# Patient Record
Sex: FEMALE | Race: BLACK OR AFRICAN AMERICAN | NOT HISPANIC OR LATINO | URBAN - METROPOLITAN AREA
[De-identification: names, ages, dates, MRNs, and addresses within clinical notes are randomized per-mention and may not be internally consistent; named-entity substitution may affect disease eponyms.]

---

## 2022-06-10 ENCOUNTER — LAB OUTSIDE AN ENCOUNTER (OUTPATIENT)
Dept: URBAN - METROPOLITAN AREA CLINIC 25 | Facility: CLINIC | Age: 72
End: 2022-06-10

## 2022-06-10 ENCOUNTER — OFFICE VISIT (OUTPATIENT)
Dept: URBAN - METROPOLITAN AREA CLINIC 25 | Facility: CLINIC | Age: 72
End: 2022-06-10
Payer: MEDICARE

## 2022-06-10 VITALS
DIASTOLIC BLOOD PRESSURE: 79 MMHG | BODY MASS INDEX: 33.06 KG/M2 | TEMPERATURE: 98.1 F | HEIGHT: 60 IN | WEIGHT: 168.4 LBS | SYSTOLIC BLOOD PRESSURE: 130 MMHG | HEART RATE: 97 BPM

## 2022-06-10 DIAGNOSIS — D53.9 MACROCYTIC ANEMIA: ICD-10-CM

## 2022-06-10 DIAGNOSIS — R74.8 ABNORMAL LIVER ENZYMES: ICD-10-CM

## 2022-06-10 DIAGNOSIS — K59.04 CHRONIC IDIOPATHIC CONSTIPATION: ICD-10-CM

## 2022-06-10 DIAGNOSIS — D69.6 THROMBOCYTOPENIA: ICD-10-CM

## 2022-06-10 DIAGNOSIS — R13.10 ESOPHAGEAL DYSPHAGIA: ICD-10-CM

## 2022-06-10 DIAGNOSIS — R19.5 OCCULT BLOOD POSITIVE STOOL: ICD-10-CM

## 2022-06-10 PROCEDURE — 99204 OFFICE O/P NEW MOD 45 MIN: CPT | Performed by: INTERNAL MEDICINE

## 2022-06-10 RX ORDER — BISACODYL 5 MG/1
TAKE 2 TABLETS BY ORAL ROUTE ONCE TABLET, COATED ORAL 1
Qty: 2 | Refills: 0 | Status: ACTIVE | COMMUNITY
Start: 2018-02-07

## 2022-06-10 RX ORDER — AMLODIPINE BESYLATE 10 MG/1
TABLET ORAL
Qty: 0 | Refills: 0 | Status: ACTIVE | COMMUNITY
Start: 2017-11-08

## 2022-06-10 RX ORDER — MONTELUKAST SODIUM 10 MG/1
TAKE 1 TABLET (10 MG) BY ORAL ROUTE ONCE DAILY IN THE EVENING TABLET, FILM COATED ORAL 1
Qty: 0 | Refills: 0 | Status: DISCONTINUED | COMMUNITY
Start: 1900-01-01

## 2022-06-10 RX ORDER — ROSUVASTATIN CALCIUM 20 MG/1
TABLET, FILM COATED ORAL
Qty: 0 | Refills: 0 | Status: DISCONTINUED | COMMUNITY
Start: 2017-11-08

## 2022-06-10 RX ORDER — ASPIRIN 81 MG/1
TABLET, CHEWABLE ORAL
Qty: 0 | Refills: 0 | Status: ACTIVE | COMMUNITY
Start: 1900-01-01

## 2022-06-10 RX ORDER — POLYETHYLENE GLYCOL 3350, SODIUM SULFATE ANHYDROUS, SODIUM BICARBONATE, SODIUM CHLORIDE, POTASSIUM CHLORIDE 236; 22.74; 6.74; 5.86; 2.97 G/4L; G/4L; G/4L; G/4L; G/4L
AS DIRECTED POWDER, FOR SOLUTION ORAL ONCE
Qty: 4000 | Refills: 0 | OUTPATIENT
Start: 2022-06-10 | End: 2022-06-11

## 2022-06-10 RX ORDER — LOSARTAN POTASSIUM 100 MG/1
TABLET, FILM COATED ORAL
Qty: 0 | Refills: 0 | Status: ACTIVE | COMMUNITY
Start: 2017-11-03

## 2022-06-10 RX ORDER — GLUCOSAMINE HCL/CHONDROITIN SU 500-400 MG
CAPSULE ORAL
Qty: 0 | Refills: 0 | Status: ACTIVE | COMMUNITY
Start: 1900-01-01

## 2022-06-10 NOTE — HPI-TODAY'S VISIT:
June 22 visit:  Patient was referred by her PCP Dr. Violet Patterson for anemia work-up and FOBT positive stools  FOBT positive stools in March 22.  Labs from January 22 revealed a hemoglobin which is low at 10.1, elevated , low platelet count 129, normal WBC count, GFR 51, elevated AST 53, ALT 36, normal bilirubin and alkaline phosphatase, normal B12  Colonoscopy in February 2018 revealed a 3 mm sigmoid colon polyp, sigmoid diverticulosis, path confirmed tubular adenoma, repeat colonoscopy advised in 5 years.  Previous colonoscopy in 2006 was completely normal.  Describes globus sensation. mucs in throat. intermittent dysphagia X 6 months.   No family history of colon cancer / GI malignancies / IBD.   on PO iron for over 10 yrs . was told she was anemia since teenage years.  iron makes her constipated. no rectal bleeding. stable weight.

## 2022-06-15 ENCOUNTER — TELEPHONE ENCOUNTER (OUTPATIENT)
Dept: URBAN - METROPOLITAN AREA CLINIC 23 | Facility: CLINIC | Age: 72
End: 2022-06-15

## 2022-06-30 ENCOUNTER — CLAIMS CREATED FROM THE CLAIM WINDOW (OUTPATIENT)
Dept: URBAN - METROPOLITAN AREA CLINIC 4 | Facility: CLINIC | Age: 72
End: 2022-06-30
Payer: MEDICARE

## 2022-06-30 ENCOUNTER — WEB ENCOUNTER (OUTPATIENT)
Dept: URBAN - METROPOLITAN AREA SURGERY CENTER 20 | Facility: SURGERY CENTER | Age: 72
End: 2022-06-30

## 2022-06-30 ENCOUNTER — OFFICE VISIT (OUTPATIENT)
Dept: URBAN - METROPOLITAN AREA SURGERY CENTER 20 | Facility: SURGERY CENTER | Age: 72
End: 2022-06-30
Payer: MEDICARE

## 2022-06-30 DIAGNOSIS — R19.5 ABNORMAL CONSISTENCY OF STOOL: ICD-10-CM

## 2022-06-30 DIAGNOSIS — K31.89 OTHER DISEASES OF STOMACH AND DUODENUM: ICD-10-CM

## 2022-06-30 DIAGNOSIS — D12.4 BENIGN NEOPLASM OF DESCENDING COLON: ICD-10-CM

## 2022-06-30 DIAGNOSIS — R13.19 CERVICAL DYSPHAGIA: ICD-10-CM

## 2022-06-30 DIAGNOSIS — D12.4 ADENOMA OF DESCENDING COLON: ICD-10-CM

## 2022-06-30 DIAGNOSIS — K31.7 BENIGN GASTRIC POLYP: ICD-10-CM

## 2022-06-30 PROCEDURE — 88305 TISSUE EXAM BY PATHOLOGIST: CPT | Performed by: PATHOLOGY

## 2022-06-30 PROCEDURE — 88312 SPECIAL STAINS GROUP 1: CPT | Performed by: PATHOLOGY

## 2022-06-30 PROCEDURE — 43239 EGD BIOPSY SINGLE/MULTIPLE: CPT | Performed by: INTERNAL MEDICINE

## 2022-06-30 PROCEDURE — G8907 PT DOC NO EVENTS ON DISCHARG: HCPCS | Performed by: INTERNAL MEDICINE

## 2022-06-30 PROCEDURE — 45380 COLONOSCOPY AND BIOPSY: CPT | Performed by: INTERNAL MEDICINE

## 2022-06-30 RX ORDER — GLUCOSAMINE HCL/CHONDROITIN SU 500-400 MG
CAPSULE ORAL
Qty: 0 | Refills: 0 | Status: ACTIVE | COMMUNITY
Start: 1900-01-01

## 2022-06-30 RX ORDER — ASPIRIN 81 MG/1
TABLET, CHEWABLE ORAL
Qty: 0 | Refills: 0 | Status: ACTIVE | COMMUNITY
Start: 1900-01-01

## 2022-06-30 RX ORDER — AMLODIPINE BESYLATE 10 MG/1
TABLET ORAL
Qty: 0 | Refills: 0 | Status: ACTIVE | COMMUNITY
Start: 2017-11-08

## 2022-06-30 RX ORDER — LOSARTAN POTASSIUM 100 MG/1
TABLET, FILM COATED ORAL
Qty: 0 | Refills: 0 | Status: ACTIVE | COMMUNITY
Start: 2017-11-03

## 2022-06-30 RX ORDER — BISACODYL 5 MG/1
TAKE 2 TABLETS BY ORAL ROUTE ONCE TABLET, COATED ORAL 1
Qty: 2 | Refills: 0 | Status: ACTIVE | COMMUNITY
Start: 2018-02-07

## 2022-07-11 ENCOUNTER — OFFICE VISIT (OUTPATIENT)
Dept: URBAN - METROPOLITAN AREA CLINIC 118 | Facility: CLINIC | Age: 72
End: 2022-07-11

## 2022-07-12 ENCOUNTER — TELEPHONE ENCOUNTER (OUTPATIENT)
Dept: URBAN - METROPOLITAN AREA CLINIC 92 | Facility: CLINIC | Age: 72
End: 2022-07-12

## 2022-08-25 ENCOUNTER — WEB ENCOUNTER (OUTPATIENT)
Dept: URBAN - METROPOLITAN AREA CLINIC 84 | Facility: CLINIC | Age: 72
End: 2022-08-25

## 2022-08-25 ENCOUNTER — OFFICE VISIT (OUTPATIENT)
Dept: URBAN - METROPOLITAN AREA CLINIC 84 | Facility: CLINIC | Age: 72
End: 2022-08-25
Payer: MEDICARE

## 2022-08-25 ENCOUNTER — TELEPHONE ENCOUNTER (OUTPATIENT)
Dept: URBAN - METROPOLITAN AREA CLINIC 92 | Facility: CLINIC | Age: 72
End: 2022-08-25

## 2022-08-25 VITALS
SYSTOLIC BLOOD PRESSURE: 137 MMHG | DIASTOLIC BLOOD PRESSURE: 70 MMHG | HEART RATE: 80 BPM | BODY MASS INDEX: 32.94 KG/M2 | WEIGHT: 167.8 LBS | HEIGHT: 60 IN | TEMPERATURE: 97.8 F

## 2022-08-25 DIAGNOSIS — R74.8 ABNORMAL LIVER ENZYMES: ICD-10-CM

## 2022-08-25 DIAGNOSIS — K59.04 CHRONIC IDIOPATHIC CONSTIPATION: ICD-10-CM

## 2022-08-25 DIAGNOSIS — D53.9 MACROCYTIC ANEMIA: ICD-10-CM

## 2022-08-25 DIAGNOSIS — Z86.010 PERSONAL HISTORY OF COLONIC POLYPS: ICD-10-CM

## 2022-08-25 DIAGNOSIS — R13.10 ESOPHAGEAL DYSPHAGIA: ICD-10-CM

## 2022-08-25 DIAGNOSIS — D69.6 THROMBOCYTOPENIA: ICD-10-CM

## 2022-08-25 DIAGNOSIS — R19.5 OCCULT BLOOD POSITIVE STOOL: ICD-10-CM

## 2022-08-25 PROCEDURE — 99213 OFFICE O/P EST LOW 20 MIN: CPT | Performed by: INTERNAL MEDICINE

## 2022-08-25 RX ORDER — CETIRIZINE HYDROCHLORIDE 10 MG/1
1 TABLET TABLET ORAL ONCE A DAY
Status: ACTIVE | COMMUNITY

## 2022-08-25 RX ORDER — GLUCOSAMINE HCL/CHONDROITIN SU 500-400 MG
CAPSULE ORAL
Qty: 0 | Refills: 0 | Status: ON HOLD | COMMUNITY
Start: 1900-01-01

## 2022-08-25 RX ORDER — BISACODYL 5 MG/1
TAKE 2 TABLETS BY ORAL ROUTE ONCE TABLET, COATED ORAL 1
Qty: 2 | Refills: 0 | Status: ON HOLD | COMMUNITY
Start: 2018-02-07

## 2022-08-25 RX ORDER — ASPIRIN 81 MG/1
TABLET, CHEWABLE ORAL
Qty: 0 | Refills: 0 | Status: ON HOLD | COMMUNITY
Start: 1900-01-01

## 2022-08-25 RX ORDER — LOSARTAN POTASSIUM 100 MG/1
TABLET, FILM COATED ORAL
Qty: 0 | Refills: 0 | Status: ACTIVE | COMMUNITY
Start: 2017-11-03

## 2022-08-25 RX ORDER — AMLODIPINE BESYLATE 10 MG/1
TABLET ORAL
Qty: 0 | Refills: 0 | Status: ON HOLD | COMMUNITY
Start: 2017-11-08

## 2022-08-25 NOTE — HPI-TODAY'S VISIT:
August 22 visit:  EGD in June 22 revealed a 2 mm gastric fundus polyp, normal esophagus.  Colonoscopy 2022 revealed a adequate bowel prep with 3 mm descending colon polyp, path confirmed tubular adenoma, gastric biopsies were benign, repeat colonoscopy advised in 5 years.  No Heartburn, Dysphagia, Melena, Rectal bleeding, Weight loss, Diarrhea, Constipation, Abdominal pain.  US and labs were done @ [facility]- but we dont have results.  No Heartburn, Dysphagia, Melena, Rectal bleeding, Weight loss, Diarrhea, Constipation, Abdominal pain.  stopped iron ( asked by pcp to stop)  on acid reflux meds ( prn) , cant recall name but is helping when she takes it.  Addendum 8/26/2022  Abdominal ultrasound from June 22 revealed fatty liver, normal portal vein, normal bile duct, absent gallbladder. Labs from June 22 revealed a low hemoglobin of 10 with elevated  and low platelet count 122, AST slightly elevated to 54, ALT normal, bilirubin normal, alkaline phosphatase normal, ALT 32, hepatitis C antibody negative, hepatitis B surface antigen negative.

## 2022-08-26 PROBLEM — 415116008: Status: ACTIVE | Noted: 2022-06-10

## 2022-08-26 PROBLEM — 40890009 ESOPHAGEAL DYSPHAGIA: Status: ACTIVE | Noted: 2022-06-10

## 2022-08-26 PROBLEM — 197321007 FATTY LIVER: Status: ACTIVE | Noted: 2022-08-26

## 2022-08-26 PROBLEM — 82934008 CHRONIC IDIOPATHIC CONSTIPATION: Status: ACTIVE | Noted: 2022-06-10

## 2023-03-30 ENCOUNTER — LAB OUTSIDE AN ENCOUNTER (OUTPATIENT)
Dept: URBAN - METROPOLITAN AREA CLINIC 84 | Facility: CLINIC | Age: 73
End: 2023-03-30

## 2023-03-30 ENCOUNTER — WEB ENCOUNTER (OUTPATIENT)
Dept: URBAN - METROPOLITAN AREA CLINIC 84 | Facility: CLINIC | Age: 73
End: 2023-03-30

## 2023-03-30 ENCOUNTER — OFFICE VISIT (OUTPATIENT)
Dept: URBAN - METROPOLITAN AREA CLINIC 84 | Facility: CLINIC | Age: 73
End: 2023-03-30
Payer: MEDICARE

## 2023-03-30 VITALS
DIASTOLIC BLOOD PRESSURE: 82 MMHG | TEMPERATURE: 98.7 F | HEIGHT: 60 IN | HEART RATE: 108 BPM | BODY MASS INDEX: 33.34 KG/M2 | WEIGHT: 169.8 LBS | SYSTOLIC BLOOD PRESSURE: 165 MMHG

## 2023-03-30 DIAGNOSIS — K76.0 FATTY (CHANGE OF) LIVER, NOT ELSEWHERE CLASSIFIED: ICD-10-CM

## 2023-03-30 DIAGNOSIS — Z86.010 PERSONAL HISTORY OF COLONIC POLYPS: ICD-10-CM

## 2023-03-30 DIAGNOSIS — R19.5 OCCULT BLOOD POSITIVE STOOL: ICD-10-CM

## 2023-03-30 DIAGNOSIS — R79.89 ELEVATED FERRITIN: ICD-10-CM

## 2023-03-30 DIAGNOSIS — R74.8 ABNORMAL LIVER ENZYMES: ICD-10-CM

## 2023-03-30 DIAGNOSIS — D69.6 THROMBOCYTOPENIA: ICD-10-CM

## 2023-03-30 DIAGNOSIS — D53.9 MACROCYTIC ANEMIA: ICD-10-CM

## 2023-03-30 PROCEDURE — 99214 OFFICE O/P EST MOD 30 MIN: CPT | Performed by: INTERNAL MEDICINE

## 2023-03-30 RX ORDER — GLUCOSAMINE HCL/CHONDROITIN SU 500-400 MG
CAPSULE ORAL
Qty: 0 | Refills: 0 | Status: ON HOLD | COMMUNITY
Start: 1900-01-01

## 2023-03-30 RX ORDER — LORATADINE 10 MG
1 TABLET TABLET ORAL ONCE A DAY
Status: ACTIVE | COMMUNITY

## 2023-03-30 RX ORDER — PANTOPRAZOLE SODIUM 40 MG/1
1 TABLET TABLET, DELAYED RELEASE ORAL ONCE A DAY
Status: ACTIVE | COMMUNITY

## 2023-03-30 RX ORDER — BISACODYL 5 MG/1
TAKE 2 TABLETS BY ORAL ROUTE ONCE TABLET, COATED ORAL 1
Qty: 2 | Refills: 0 | Status: ON HOLD | COMMUNITY
Start: 2018-02-07

## 2023-03-30 RX ORDER — ALLOPURINOL 100 MG/1
1 TABLET TABLET ORAL ONCE A DAY
Status: ACTIVE | COMMUNITY

## 2023-03-30 RX ORDER — ASPIRIN 81 MG/1
TABLET, CHEWABLE ORAL
Qty: 0 | Refills: 0 | Status: ON HOLD | COMMUNITY
Start: 1900-01-01

## 2023-03-30 RX ORDER — LOSARTAN POTASSIUM 100 MG/1
TABLET, FILM COATED ORAL
Qty: 0 | Refills: 0 | Status: ACTIVE | COMMUNITY
Start: 2017-11-03

## 2023-03-30 RX ORDER — AMLODIPINE BESYLATE 10 MG/1
TABLET ORAL
Qty: 0 | Refills: 0 | Status: ON HOLD | COMMUNITY
Start: 2017-11-08

## 2023-03-30 NOTE — HPI-TODAY'S VISIT:
March 2023 visit: pt believes she was told stool occult was positive ( feb 2023). pt denies any GI symptoms. no weight loss / abdominal pain / rectal bleeding. restarted iron because pcp told her to restart for low iron.  also using PPI daily for reflux symptoms.  labs from jan 2023 - hb 9.4, normal plt 163. high mcv 103. cr 1.3, gfr 43. Iron sat high 81%, iron 194 ( high). normal b 12/ folate. ferritin elevated to 3822. AST elevated to 49, otherwise normal LFTs.

## 2023-03-30 NOTE — HPI-OTHER HISTORIES
August 22 visit:  EGD in June 22 revealed a 2 mm gastric fundus polyp, normal esophagus.   Colonoscopy 2022 revealed a adequate bowel prep with 3 mm descending colon polyp, path confirmed tubular adenoma, gastric biopsies were benign, repeat colonoscopy advised in 5 years.  No Heartburn, Dysphagia, Melena, Rectal bleeding, Weight loss, Diarrhea, Constipation, Abdominal pain.  US and labs were done @ [facility]- but we dont have results.  No Heartburn, Dysphagia, Melena, Rectal bleeding, Weight loss, Diarrhea, Constipation, Abdominal pain.  stopped iron ( asked by pcp to stop)  on acid reflux meds ( prn) , cant recall name but is helping when she takes it.  Addendum 8/26/2022  Abdominal ultrasound from June 22 revealed fatty liver, normal portal vein, normal bile duct, absent gallbladder. Labs from June 22 revealed a low hemoglobin of 10 with elevated  and low platelet count 122, AST slightly elevated to 54, ALT normal, bilirubin normal, alkaline phosphatase normal, ALT 32, hepatitis C antibody negative, hepatitis B surface antigen negative.  June 22 visit:  Patient was referred by her PCP Dr. Violet Patterson for anemia work-up and FOBT positive stools  FOBT positive stools in March 22.  Labs from January 22 revealed a hemoglobin which is low at 10.1, elevated , low platelet count 129, normal WBC count, GFR 51, elevated AST 53, ALT 36, normal bilirubin and alkaline phosphatase, normal B12  Colonoscopy in February 2018 revealed a 3 mm sigmoid colon polyp, sigmoid diverticulosis, path confirmed tubular adenoma, repeat colonoscopy advised in 5 years.  Previous colonoscopy in 2006 was completely normal.  Describes globus sensation. mucs in throat. intermittent dysphagia X 6 months.   No family history of colon cancer / GI malignancies / IBD.   on PO iron for over 10 yrs . was told she was anemia since teenage years.  iron makes her constipated. no rectal bleeding. stable weight.

## 2023-07-13 ENCOUNTER — LAB OUTSIDE AN ENCOUNTER (OUTPATIENT)
Dept: URBAN - METROPOLITAN AREA CLINIC 84 | Facility: CLINIC | Age: 73
End: 2023-07-13

## 2023-07-13 ENCOUNTER — DASHBOARD ENCOUNTERS (OUTPATIENT)
Age: 73
End: 2023-07-13

## 2023-07-13 ENCOUNTER — WEB ENCOUNTER (OUTPATIENT)
Dept: URBAN - METROPOLITAN AREA CLINIC 84 | Facility: CLINIC | Age: 73
End: 2023-07-13

## 2023-07-13 ENCOUNTER — OFFICE VISIT (OUTPATIENT)
Dept: URBAN - METROPOLITAN AREA CLINIC 84 | Facility: CLINIC | Age: 73
End: 2023-07-13
Payer: MEDICARE

## 2023-07-13 VITALS
HEIGHT: 60 IN | HEART RATE: 102 BPM | TEMPERATURE: 98.1 F | BODY MASS INDEX: 33.06 KG/M2 | WEIGHT: 168.4 LBS | DIASTOLIC BLOOD PRESSURE: 84 MMHG | SYSTOLIC BLOOD PRESSURE: 161 MMHG

## 2023-07-13 DIAGNOSIS — D53.9 MACROCYTIC ANEMIA: ICD-10-CM

## 2023-07-13 DIAGNOSIS — Z90.49 HISTORY OF CHOLECYSTECTOMY: ICD-10-CM

## 2023-07-13 DIAGNOSIS — R19.5 OCCULT BLOOD POSITIVE STOOL: ICD-10-CM

## 2023-07-13 DIAGNOSIS — K76.0 FATTY (CHANGE OF) LIVER, NOT ELSEWHERE CLASSIFIED: ICD-10-CM

## 2023-07-13 DIAGNOSIS — D69.6 THROMBOCYTOPENIA: ICD-10-CM

## 2023-07-13 DIAGNOSIS — R74.8 ABNORMAL LIVER ENZYMES: ICD-10-CM

## 2023-07-13 DIAGNOSIS — R79.89 ELEVATED FERRITIN: ICD-10-CM

## 2023-07-13 DIAGNOSIS — Z86.010 PERSONAL HISTORY OF COLONIC POLYPS: ICD-10-CM

## 2023-07-13 PROCEDURE — 99213 OFFICE O/P EST LOW 20 MIN: CPT | Performed by: INTERNAL MEDICINE

## 2023-07-13 RX ORDER — BISACODYL 5 MG/1
TAKE 2 TABLETS BY ORAL ROUTE ONCE TABLET, COATED ORAL 1
Qty: 2 | Refills: 0 | Status: ON HOLD | COMMUNITY
Start: 2018-02-07

## 2023-07-13 RX ORDER — AMLODIPINE BESYLATE 10 MG/1
TABLET ORAL
Qty: 0 | Refills: 0 | Status: ON HOLD | COMMUNITY
Start: 2017-11-08

## 2023-07-13 RX ORDER — ALLOPURINOL 100 MG/1
1 TABLET TABLET ORAL ONCE A DAY
Status: ACTIVE | COMMUNITY

## 2023-07-13 RX ORDER — GLUCOSAMINE HCL/CHONDROITIN SU 500-400 MG
CAPSULE ORAL
Qty: 0 | Refills: 0 | Status: ON HOLD | COMMUNITY
Start: 1900-01-01

## 2023-07-13 RX ORDER — POLYETHYLENE GLYCOL 3350, SODIUM SULFATE ANHYDROUS, SODIUM BICARBONATE, SODIUM CHLORIDE, POTASSIUM CHLORIDE 236; 22.74; 6.74; 5.86; 2.97 G/4L; G/4L; G/4L; G/4L; G/4L
AS DIRECTED POWDER, FOR SOLUTION ORAL ONCE
Qty: 4000 | Refills: 0 | OUTPATIENT
Start: 2023-07-13 | End: 2023-07-14

## 2023-07-13 RX ORDER — LORATADINE 10 MG
1 TABLET TABLET ORAL ONCE A DAY
Status: ACTIVE | COMMUNITY

## 2023-07-13 RX ORDER — LOSARTAN POTASSIUM 100 MG/1
TABLET, FILM COATED ORAL
Qty: 0 | Refills: 0 | Status: ACTIVE | COMMUNITY
Start: 2017-11-03

## 2023-07-13 RX ORDER — PANTOPRAZOLE SODIUM 40 MG/1
1 TABLET TABLET, DELAYED RELEASE ORAL ONCE A DAY
Status: ACTIVE | COMMUNITY

## 2023-07-13 RX ORDER — ASPIRIN 81 MG/1
TABLET, CHEWABLE ORAL
Qty: 0 | Refills: 0 | Status: ON HOLD | COMMUNITY
Start: 1900-01-01

## 2023-07-13 NOTE — HPI-OTHER HISTORIES
March 2023 visit: pt believes she was told stool occult was positive ( feb 2023). pt denies any GI symptoms. no weight loss / abdominal pain / rectal bleeding. restarted iron because pcp told her to restart for low iron.  also using PPI daily for reflux symptoms.  labs from jan 2023 - hb 9.4, normal plt 163. high mcv 103. cr 1.3, gfr 43. Iron sat high 81%, iron 194 ( high). normal b 12/ folate. ferritin elevated to 3822. AST elevated to 49, otherwise normal LFTs.  August 22 visit:  EGD in June 22 revealed a 2 mm gastric fundus polyp, normal esophagus.   Colonoscopy 2022 revealed a adequate bowel prep with 3 mm descending colon polyp, path confirmed tubular adenoma, gastric biopsies were benign, repeat colonoscopy advised in 5 years.  No Heartburn, Dysphagia, Melena, Rectal bleeding, Weight loss, Diarrhea, Constipation, Abdominal pain.  US and labs were done @ [facility]- but we dont have results.  No Heartburn, Dysphagia, Melena, Rectal bleeding, Weight loss, Diarrhea, Constipation, Abdominal pain.  stopped iron ( asked by pcp to stop)  on acid reflux meds ( prn) , cant recall name but is helping when she takes it.  Addendum 8/26/2022  Abdominal ultrasound from June 22 revealed fatty liver, normal portal vein, normal bile duct, absent gallbladder. Labs from June 22 revealed a low hemoglobin of 10 with elevated  and low platelet count 122, AST slightly elevated to 54, ALT normal, bilirubin normal, alkaline phosphatase normal, ALT 32, hepatitis C antibody negative, hepatitis B surface antigen negative.  June 22 visit:  Patient was referred by her PCP Dr. Violet Patterson for anemia work-up and FOBT positive stools  FOBT positive stools in March 22.  Labs from January 22 revealed a hemoglobin which is low at 10.1, elevated , low platelet count 129, normal WBC count, GFR 51, elevated AST 53, ALT 36, normal bilirubin and alkaline phosphatase, normal B12  Colonoscopy in February 2018 revealed a 3 mm sigmoid colon polyp, sigmoid diverticulosis, path confirmed tubular adenoma, repeat colonoscopy advised in 5 years.  Previous colonoscopy in 2006 was completely normal.  Describes globus sensation. mucs in throat. intermittent dysphagia X 6 months.   No family history of colon cancer / GI malignancies / IBD.   on PO iron for over 10 yrs . was told she was anemia since teenage years.  iron makes her constipated. no rectal bleeding. stable weight.

## 2023-07-13 NOTE — HPI-TODAY'S VISIT:
July 23 visit:  mild constipation , stool freq at baseline. did not see hematology.  US abdo is normal. no mention of fatty liver. she is now agreeable to having a colonoscopy.  She is also scheduled for a CAT scan of her abdomen and pelvis by her PCP.  She denies any abdominal pain or any unusual GI symptoms.

## 2023-07-18 ENCOUNTER — CLAIMS CREATED FROM THE CLAIM WINDOW (OUTPATIENT)
Dept: URBAN - METROPOLITAN AREA CLINIC 4 | Facility: CLINIC | Age: 73
End: 2023-07-18
Payer: MEDICARE

## 2023-07-18 ENCOUNTER — OFFICE VISIT (OUTPATIENT)
Dept: URBAN - METROPOLITAN AREA SURGERY CENTER 20 | Facility: SURGERY CENTER | Age: 73
End: 2023-07-18
Payer: MEDICARE

## 2023-07-18 ENCOUNTER — TELEPHONE ENCOUNTER (OUTPATIENT)
Dept: URBAN - METROPOLITAN AREA CLINIC 84 | Facility: CLINIC | Age: 73
End: 2023-07-18

## 2023-07-18 DIAGNOSIS — K63.5 BENIGN COLON POLYP: ICD-10-CM

## 2023-07-18 DIAGNOSIS — K63.89 OTHER SPECIFIED DISEASES OF INTESTINE: ICD-10-CM

## 2023-07-18 DIAGNOSIS — R19.5 ABNORMAL CONSISTENCY OF STOOL: ICD-10-CM

## 2023-07-18 PROCEDURE — 45381 COLONOSCOPY SUBMUCOUS NJX: CPT | Performed by: INTERNAL MEDICINE

## 2023-07-18 PROCEDURE — 45385 COLONOSCOPY W/LESION REMOVAL: CPT | Performed by: INTERNAL MEDICINE

## 2023-07-18 PROCEDURE — G8907 PT DOC NO EVENTS ON DISCHARG: HCPCS | Performed by: INTERNAL MEDICINE

## 2023-07-18 PROCEDURE — 88305 TISSUE EXAM BY PATHOLOGIST: CPT | Performed by: PATHOLOGY

## 2023-07-18 RX ORDER — LOSARTAN POTASSIUM 100 MG/1
TABLET, FILM COATED ORAL
Qty: 0 | Refills: 0 | Status: ACTIVE | COMMUNITY
Start: 2017-11-03

## 2023-07-18 RX ORDER — ALLOPURINOL 100 MG/1
1 TABLET TABLET ORAL ONCE A DAY
Status: ACTIVE | COMMUNITY

## 2023-07-18 RX ORDER — ASPIRIN 81 MG/1
TABLET, CHEWABLE ORAL
Qty: 0 | Refills: 0 | Status: ON HOLD | COMMUNITY
Start: 1900-01-01

## 2023-07-18 RX ORDER — GLUCOSAMINE HCL/CHONDROITIN SU 500-400 MG
CAPSULE ORAL
Qty: 0 | Refills: 0 | Status: ON HOLD | COMMUNITY
Start: 1900-01-01

## 2023-07-18 RX ORDER — LORATADINE 10 MG
1 TABLET TABLET ORAL ONCE A DAY
Status: ACTIVE | COMMUNITY

## 2023-07-18 RX ORDER — BISACODYL 5 MG/1
TAKE 2 TABLETS BY ORAL ROUTE ONCE TABLET, COATED ORAL 1
Qty: 2 | Refills: 0 | Status: ON HOLD | COMMUNITY
Start: 2018-02-07

## 2023-07-18 RX ORDER — PANTOPRAZOLE SODIUM 40 MG/1
1 TABLET TABLET, DELAYED RELEASE ORAL ONCE A DAY
Status: ACTIVE | COMMUNITY

## 2023-07-18 RX ORDER — AMLODIPINE BESYLATE 10 MG/1
TABLET ORAL
Qty: 0 | Refills: 0 | Status: ON HOLD | COMMUNITY
Start: 2017-11-08

## 2023-07-28 ENCOUNTER — TELEPHONE ENCOUNTER (OUTPATIENT)
Dept: URBAN - METROPOLITAN AREA CLINIC 25 | Facility: CLINIC | Age: 73
End: 2023-07-28

## 2023-11-09 ENCOUNTER — OFFICE VISIT (OUTPATIENT)
Dept: URBAN - METROPOLITAN AREA CLINIC 84 | Facility: CLINIC | Age: 73
End: 2023-11-09

## 2024-05-02 ENCOUNTER — OFFICE VISIT (OUTPATIENT)
Dept: URBAN - METROPOLITAN AREA CLINIC 84 | Facility: CLINIC | Age: 74
End: 2024-05-02

## 2024-12-26 ENCOUNTER — OFFICE VISIT (OUTPATIENT)
Dept: URBAN - METROPOLITAN AREA CLINIC 84 | Facility: CLINIC | Age: 74
End: 2024-12-26

## 2025-01-02 ENCOUNTER — OFFICE VISIT (OUTPATIENT)
Dept: URBAN - METROPOLITAN AREA CLINIC 84 | Facility: CLINIC | Age: 75
End: 2025-01-02

## 2025-02-07 ENCOUNTER — OFFICE VISIT (OUTPATIENT)
Dept: URBAN - METROPOLITAN AREA CLINIC 86 | Facility: CLINIC | Age: 75
End: 2025-02-07

## 2025-02-07 NOTE — HPI-TODAY'S VISIT:
Patient is a 74-year-old female followed by Dr. Hong and we are asked to see for abnormal liver labs. Copy the note will be sent to patient's primary providers. Patient was last seen on July 13, 2023 at the [place] office. She was being seen for history of occult positive stool noted in [place] 2023 but without weight loss for this issues.  She will be restarted on iron because she had a low iron.  She was getting a PPI.  Labs in January 2023 which showed a hemoglobin 9.4 plate count 163.  Iron sat that was high as 81%.  Ferritin of 3822 AST of 49 and otherwise normal liver labs.  Certainly concerning why the higher iron sat was noted as well as a ferritin. August 2022 she had been seen and had a colonoscopy done in 2022 that showed adequate prep and 3 mm descending colon polyp.  Gastric biopsies also had been done that were benign and repeat: Was recommended for 5 years. She had an abnormal ultrasound from August thousand 22 suggestive of fatty liver with normal portal vein normal bile duct and absent gallbladder. She had an AST back in June 2022 there was an AST of 54 and normal ALT and a normal bilirubin with the ALT being 32 with ideal ALT being less than 25.  Hep C antibody negative B surface antigen negative. Medicines had included pantoprazole, loratadine, iron, allopurinol and losartan. Weight was noted to be in class I obesity with a BMI of 32.88 and a weight of 168.4. April 2023 imaging showed liver with normal echogenicity and no lesions portal vein patent and hepatic veins patent.  No dilated ducts.  Common bile duct 3 mm.  Postcholecystectomy changes seen.  Suresh sign negative.  Right kidney 8.9 cm and left kidney 9.7 cm.  Spleen 7.2 cm.  Ultrasound felt to be normal again at that timeframe. We also saw the referral records from [facility] for abnormal liver labs and concern for hemochromatosis. Medicines listed included acetaminophen as needed, clotrimazole 1% cream to the vaginal area as needed, iron 325 mg once a day previously, fluticasone 50 mcg spray, Lasix 20 mg a day.  Loratadine 10 mg a day.  Losartan/hydrochlorothiazide 50/12.5 once a day.  Meclizine 25 mg 3 times a day.  MiraLAX 17 g once a day as needed.  Nitroglycerin as needed.  Pantoprazole 40 mg a day.  Prolia every 6 months.  Triamcinolone acetonide 0.1% cream apply as needed.  Vitamin D 50,000 units once a week. December 13, 2024 labs showed hep A immunity.  Hep A IgM negative.  B surface antigen negative B surface antibody negative.  B core total negative for patient needs hep B vaccine series.  Hep C antibody negative.  WBC 3.0 hemoglobin 9.7 .  Plate count low at 90.  Neutrophils 1.6 lymphocytes 1.0.  Cholesterol 164 triglycerides 286 HDL 39 LDL 78.  Iron sat 72%. August 2024 labs show WBC 5.3 hemoglobin 9.3  plate count 141.  Ferritin 3742. Folate 3.2.  TSH duplicate.  HIV negative.  TSH 2.15 B12 675 ceruloplasmin 26.4 and ferritin 3386. Oct 2023 Cholesterol 143, triglycerides 146 HDL 46 LDL 72.  Plan 1.  Order hemochromatosis assay. 2.  _update iron sat and other liver labs. 3.  Needs MRI to assess liver and fibrosis as it can also help us to quantify her iron without a biopsy.  It would tell us about the liver fat potential.  Patients with ferritin over thousand also felt to be higher risk for fibrosis at this will help us to get that information as well and is not able to be ascertained otherwise. 4.  Reassess post above. 5.  Check hep B immunity status.  Patient confirmed hep A immune. 6.  Is possible she could have iron overload due to chronic iron usage.  Also could have iron overload due to the absorption issues.  Fatty liver also had some added risk factors well.  Reassess post above. Duration of visit was minutes total with minutes spent preparing chart, and loading information into [facility] with additional minutes spent for this face to face/TeleMed visit with time spent reviewing patient's chart, notes, labs and discussing treatment plan with time spent discussing plans with pt.

## 2025-03-30 ENCOUNTER — TELEPHONE ENCOUNTER (OUTPATIENT)
Dept: URBAN - METROPOLITAN AREA CLINIC 86 | Facility: CLINIC | Age: 75
End: 2025-03-30

## 2025-03-30 NOTE — HPI-TODAY'S VISIT:
Patient is a 74-year-old female followed by Dr. Hong and we are asked to see for abnormal liver labs.    Copy the note will be sent to patient's primary providers.    Patient was last seen on July 13, 2023 at the [place] office.    She was being seen for history of occult positive stool noted in [place] 2023 but without weight loss for this issues. She will be restarted on iron because she had a low iron. She was getting a PPI. Labs in January 2023 which showed a hemoglobin 9.4 plate count 163. Iron sat that was high as 81%. Ferritin of 3822 AST of 49 and otherwise normal liver labs. Certainly concerning why the higher iron sat was noted as well as a ferritin.    August 2022 she had been seen and had a colonoscopy done in 2022 that showed adequate prep and 3 mm descending colon polyp. Gastric biopsies also had been done that were benign and repeat: Was recommended for 5 years.    She had an abnormal ultrasound from August thousand 22 suggestive of fatty liver with normal portal vein normal bile duct and absent gallbladder.    She had an AST back in June 2022 there was an AST of 54 and normal ALT and a normal bilirubin with the ALT being 32 with ideal ALT being less than 25. Hep C antibody negative B surface antigen negative.    Medicines had included pantoprazole, loratadine, iron, allopurinol and losartan.    Weight was noted to be in class I obesity with a BMI of 32.88 and a weight of 168.4.    April 2023 imaging showed liver with normal echogenicity and no lesions portal vein patent and hepatic veins patent. No dilated ducts. Common bile duct 3 mm. Postcholecystectomy changes seen. Suresh sign negative. Right kidney 8.9 cm and left kidney 9.7 cm. Spleen 7.2 cm. Ultrasound felt to be normal again at that timeframe.    We also saw the referral records from [facility] for abnormal liver labs and concern for hemochromatosis.    Medicines listed included acetaminophen as needed, clotrimazole 1% cream to the vaginal area as needed, iron 325 mg once a day previously, fluticasone 50 mcg spray, Lasix 20 mg a day. Loratadine 10 mg a day. Losartan/hydrochlorothiazide 50/12.5 once a day. Meclizine 25 mg 3 times a day. MiraLAX 17 g once a day as needed. Nitroglycerin as needed. Pantoprazole 40 mg a day. Prolia every 6 months. Triamcinolone acetonide 0.1% cream apply as needed. Vitamin D 50,000 units once a week.    December 13, 2024 labs showed hep A immunity. Hep A IgM negative. B surface antigen negative B surface antibody negative. B core total negative for patient needs hep B vaccine series. Hep C antibody negative. WBC 3.0 hemoglobin 9.7 . Plate count low at 90. Neutrophils 1.6 lymphocytes 1.0. Cholesterol 164 triglycerides 286 HDL 39 LDL 78. Iron sat 72%.    August 2024 labs show WBC 5.3 hemoglobin 9.3  plate count 141. Ferritin 3742.    Folate 3.2. TSH duplicate. HIV negative. TSH 2.15 B12 675 ceruloplasmin 26.4 and ferritin 3386.    Oct 2023 Cholesterol 143, triglycerides 146 HDL 46 LDL 72. Plan    1. Order hemochromatosis assay.    2. _update iron sat and other liver labs.    3. Needs MRI to assess liver and fibrosis as it can also help us to quantify her iron without a biopsy. It would tell us about the liver fat potential. Patients with ferritin over thousand also felt to be higher risk for fibrosis at this will help us to get that information as well and is not able to be ascertained otherwise.    4. Reassess post above.    5. Check hep B immunity status. Patient confirmed hep A immune.    6. Is possible she could have iron overload due to chronic iron usage. Also could have iron overload due to the absorption issues. Fatty liver also had some added risk factors well. Reassess post above.    Duration of visit was minutes total with minutes spent preparing chart, and loading information into [facility] with additional minutes spent for this face to face/TeleMed visit with time spent reviewing patient's chart, notes, labs and discussing treatment plan with time spent discussing plans with pt.       1.      Abnnormal liver enzymes noted and needing to have further workup to assess the issues of fatty liver versus other causes.  Iron overload suspected.  Chronic disorders can potentially cause this as well as iron supplementation.  Need to follow-up iron levels ferritin consider HFE testing if not already done.  Check ultrasound with Doppler and elastography to assess chronicity.  Check fib 4/ELF.  Reassess post above.  2.  Fatty liver suspected and update imaging and get fib 4/ELF.  Discussed healthy habits and other possible efforts to look at.  Need assess fibrosis stage.  3.  Hyperlipidemia certainly risk factor for fatty liver for the patient to have as well and treatment of same is important.  4.  Iron overload suspected to check iron level as well as ferritin look for potential sources this either oral supplementation or medications that could augment this issue.  5.  Diverticulosis history noted in the sigmoid or prior workup.  6.  Colon polyps noted in history as per GI.  7.  Allergic rhinitis history noted as above.  8.  Degenerative joint disease history noted.  Usage of pain meds for send can also potentially check liver and other factors as well.  9.  Osteopenia history noted as per team.  10.  Aortic atherosclerosis noted as above.  11.  High risk medicine use noted as above.  12.  Anemia history noted as above and as per GI and local team.  13.  Hypertension noted     Plan:     1. Order hemochromatosis assay.    2. _update iron sat and other liver labs.    3. Needs MRI to assess liver and fibrosis as it can also help us to quantify her iron without a biopsy. It would tell us about the liver fat potential. Patients with ferritin over thousand also felt to be higher risk for fibrosis at this will help us to get that information as well and is not able to be ascertained otherwise.    4. Reassess post above.    5. Check hep B immunity status. Patient confirmed hep A immune.    6. Is possible she could have iron overload due to chronic iron usage. Also could have iron overload due to the absorption issues. Fatty liver also had some added risk factors well. Reassess post above

## 2025-04-04 ENCOUNTER — LAB OUTSIDE AN ENCOUNTER (OUTPATIENT)
Dept: URBAN - METROPOLITAN AREA CLINIC 86 | Facility: CLINIC | Age: 75
End: 2025-04-04

## 2025-04-04 ENCOUNTER — OFFICE VISIT (OUTPATIENT)
Dept: URBAN - METROPOLITAN AREA CLINIC 86 | Facility: CLINIC | Age: 75
End: 2025-04-04
Payer: MEDICARE

## 2025-04-04 DIAGNOSIS — J30.9 ALLERGIC RHINITIS: ICD-10-CM

## 2025-04-04 DIAGNOSIS — K76.0 FATTY LIVER: ICD-10-CM

## 2025-04-04 DIAGNOSIS — Z79.899 HIGH RISK MEDICATION USE: ICD-10-CM

## 2025-04-04 DIAGNOSIS — K63.5 COLON POLYPS: ICD-10-CM

## 2025-04-04 DIAGNOSIS — K57.90 DIVERTICULOSIS: ICD-10-CM

## 2025-04-04 DIAGNOSIS — D64.9 ANEMIA: ICD-10-CM

## 2025-04-04 DIAGNOSIS — M85.80 OSTEOPENIA: ICD-10-CM

## 2025-04-04 DIAGNOSIS — R74.8 ABNORMAL LIVER ENZYMES: ICD-10-CM

## 2025-04-04 DIAGNOSIS — I70.0 AORTIC ATHEROSCLEROSIS: ICD-10-CM

## 2025-04-04 DIAGNOSIS — E78.5 HYPERLIPIDEMIA: ICD-10-CM

## 2025-04-04 DIAGNOSIS — I10 HTN: ICD-10-CM

## 2025-04-04 DIAGNOSIS — M19.90 DEGENERATIVE JOINT DISEASE: ICD-10-CM

## 2025-04-04 DIAGNOSIS — E83.19 IRON OVERLOAD: ICD-10-CM

## 2025-04-04 PROCEDURE — 99215 OFFICE O/P EST HI 40 MIN: CPT

## 2025-04-04 RX ORDER — LOSARTAN POTASSIUM 100 MG/1
TABLET, FILM COATED ORAL
Qty: 0 | Refills: 0 | Status: ACTIVE | COMMUNITY
Start: 2017-11-03

## 2025-04-04 RX ORDER — PANTOPRAZOLE SODIUM 40 MG/1
1 TABLET TABLET, DELAYED RELEASE ORAL ONCE A DAY
Status: ACTIVE | COMMUNITY

## 2025-04-04 RX ORDER — LORATADINE 10 MG
1 TABLET TABLET ORAL ONCE A DAY
Status: ACTIVE | COMMUNITY

## 2025-04-04 RX ORDER — ALLOPURINOL 100 MG/1
1 TABLET TABLET ORAL ONCE A DAY
Status: DISCONTINUED | COMMUNITY

## 2025-04-04 RX ORDER — BISACODYL 5 MG/1
TAKE 2 TABLETS BY ORAL ROUTE ONCE TABLET, COATED ORAL 1
Qty: 2 | Refills: 0 | Status: DISCONTINUED | COMMUNITY
Start: 2018-02-07

## 2025-04-04 RX ORDER — OMEGA-3/DHA/EPA/FISH OIL 1000 MG
CAPSULE ORAL
Qty: 0 | Refills: 0 | Status: DISCONTINUED | COMMUNITY
Start: 1900-01-01

## 2025-04-04 RX ORDER — FUROSEMIDE 20 MG/1
1 TABLET TABLET ORAL ONCE A DAY
Status: ACTIVE | COMMUNITY

## 2025-04-04 RX ORDER — ASPIRIN 81 MG/1
TABLET, CHEWABLE ORAL
Qty: 0 | Refills: 0 | Status: DISCONTINUED | COMMUNITY
Start: 1900-01-01

## 2025-04-04 RX ORDER — AMLODIPINE BESYLATE 10 MG/1
TABLET ORAL
Qty: 0 | Refills: 0 | Status: ON HOLD | COMMUNITY
Start: 2017-11-08

## 2025-04-04 NOTE — EXAM-PHYSICAL EXAM
Gen: awake and responsive.   Eyes: anicteric, normal lids.   Mouth: normal lips.   Nose: no drainage.   Hearing: intact grossly.   Neck: trachea midline and no jvd.   CV: RRR no s3.   Lungs: clear. No wheezes,   Abd: Soft, nabs, NR, NT. No appreciable liver or spleen enlargement.   Ext: no sig leg edema, some palm erythema. Left hand first dgit missing from trauma   Neuro: moves all 4 ext grossly.

## 2025-04-04 NOTE — HPI-TODAY'S VISIT:
Patient is a 74-year-old female followed by Dr. Hong and we are asked to see for abnormal liver labs.  Copy the note will be sent to patient's primary providers.  Patient was last seen on July 13, 2023 at the [place] office.  Since then not seeing anyone for the liver issues.  Saw his note in 2023:  She was being seen for history of occult positive stool noted in  2023 but without weight loss for this issues.  She will be restarted on iron because she had a low iron.  She was getting a PPI.  Labs in January 2023 which showed a hemoglobin 9.4 platelet count 163.  Iron sat that was high as 81%.  Ferritin of 3822 AST of 49 and otherwise normal liver labs.  Certainly concerning why the higher iron sat was noted as well as a ferritin.  August 2022 she had been seen and had a colonoscopy done in 2022 that showed adequate prep and 3 mm descending colon polyp.  Gastric biopsies also had been done that were benign and repeat: Was recommended for 5 years.  She had an abnormal ultrasound from August 2022 suggestive of fatty liver with normal portal vein normal bile duct and absent gallbladder.  Patient coming in for abnormal liver labs and local labs from primary showed from March 262025  glucose 89 BUN 25 creatinine 1.4 elevated sodium 140 potassium 4.3 chloride 104 calcium 9.7 albumin 3.8 bilirubin 0.4 alk phos 97 AST 42 and ALT 19 and back on February 8, 2024 alk phos 104 AST 29 ALT of 17.   TSH was 2.41.  Magnesium 1.4.  June 14 2023 labs showed previously your AST had been as high as 54 down to 29 on February 2024 then these labs had been up to an AST of 42 on March 26.   WBC was 4.7 hemoglobin low at 9.4  plate count 161 and previously in December 2024 WBC 3 hemoglobin 9.7 platelet count 90 and .  The plate count had gone up.   proBNP was 1223 when the March 26 labs were run and previously had been lower at 745 back in 2023.  She says has a heart montior and told heart is ok and no heart failure and sees someone for heart.    Platelet count had been as low as 90,000 back in  December 2024 and up to 161 as of March 26 that was one of the highest platelet counts in the last 2 years.  She had an AST back in June 2022 there was an AST of 54 and normal ALT and a normal bilirubin with the ALT being 32 with ideal ALT being less than 25.  Hep C antibody negative B surface antigen negative.  Medicines had included pantoprazole, loratadine, iron, allopurinol and losartan.  Weight was noted to be in class I obesity with a BMI of 32.88 and a weight of 168.4 in 2023 and weight now was 147 and down 21 pounds since when saw him in 2023.  Had pneumonia recently.  April 2023 imaging showed liver with normal echogenicity and no lesions portal vein patent and hepatic veins patent.  No dilated ducts.  Common bile duct 3 mm.  Postcholecystectomy changes seen.  Suresh sign negative.  Right kidney 8.9 cm and left kidney 9.7 cm.  Spleen 7.2 cm.  Ultrasound felt to be normal again at that timeframe.  We also saw the referral records from [facility] for abnormal liver labs and concern for hemochromatosis.   Medicines listed included acetaminophen as needed, clotrimazole 1% cream to the vaginal area as needed, iron 325 mg once a day previously, fluticasone 50 mcg spray, Lasix 20 mg a day.  Loratadine 10 mg a day.  Losartan/hydrochlorothiazide 50/12.5 once a day.  Meclizine 25 mg 3 times a day.  MiraLAX 17 g once a day as needed.  Nitroglycerin as needed.  Pantoprazole 40 mg a day.  Prolia every 6 months.  Triamcinolone acetonide 0.1% cream apply as needed.  Vitamin D 50,000 units once a week.  December 13, 2024 labs showed hep A immunity.  Hep A IgM negative.  B surface antigen negative B surface antibody negative.  B core total negative for patient needs hep B vaccine series.  Hep C antibody negative.  WBC 3.0 hemoglobin 9.7 .  Plate count low at 90.  Neutrophils 1.6 lymphocytes 1.0.  Cholesterol 164 triglycerides 286 HDL 39 LDL 78.  Iron sat 72%.  August 2024 labs show WBC 5.3 hemoglobin 9.3  plate count 141.  Ferritin 3742.  Folate 3.2.  TSH duplicate.  HIV negative.  TSH 2.15 B12 675 ceruloplasmin 26.4 and ferritin 3386.  Oct 2023 Cholesterol 143, triglycerides 146 HDL 46 LDL 72.   Plan  1.  Order hemochromatosis assay and look at that.  2. Need update iron sat and other liver labs.  3.  Needs u.s with elastography and liklehy an MRI pending that to assess liver and fibrosis as it can also help us to quantify her iron without a biopsy.  It would tell us about the liver fat potential.  Patients with ferritin over thousand also felt to be higher risk for fibrosis at this will help us to get that information as well and is not able to be ascertained otherwise.  4.  Reassess post above.  5. Needs to consider hep b vaccine as no hep B immunity status.  Patient confirmed hep A immune.  6.  Is possible she could have iron overload due to chronic iron usage and she is off that.  Also could have iron overload due to the absorption issues.  Fatty liver also had some added risk factors well.  Reassess post above.  Duration of visit was 80 minutes total with 40 minutes spent preparing chart, and loading information into [facility] with additional 40 minutes spent for this face to face/TeleMed visit with time spent reviewing patient's chart, notes, labs and discussing treatment plan with time spent discussing plans with pt.

## 2025-04-09 ENCOUNTER — TELEPHONE ENCOUNTER (OUTPATIENT)
Dept: URBAN - METROPOLITAN AREA CLINIC 86 | Facility: CLINIC | Age: 75
End: 2025-04-09

## 2025-04-10 ENCOUNTER — TELEPHONE ENCOUNTER (OUTPATIENT)
Dept: URBAN - METROPOLITAN AREA CLINIC 86 | Facility: CLINIC | Age: 75
End: 2025-04-10

## 2025-04-14 ENCOUNTER — TELEPHONE ENCOUNTER (OUTPATIENT)
Dept: URBAN - METROPOLITAN AREA CLINIC 86 | Facility: CLINIC | Age: 75
End: 2025-04-14

## 2025-04-14 LAB
ABSOLUTE BASOPHILS: 9
ABSOLUTE EOSINOPHILS: 19
ABSOLUTE LYMPHOCYTES: 1133
ABSOLUTE MONOCYTES: 517
ABSOLUTE NEUTROPHILS: 3022
ACTIN (SMOOTH MUSCLE) ANTIBODY (IGG): <20
ALBUMIN/GLOBULIN RATIO: 1.1
ALBUMIN: 4
ALKALINE PHOSPHATASE: 73
ALPHA-1-ANTITRYPSIN (AAT) PHENOTYPE: (no result)
ALT: 20
ANA SCREEN, IFA: POSITIVE
AST: 40
BASOPHILS: 0.2
BILIRUBIN, DIRECT: 0.2
BILIRUBIN, INDIRECT: 0.5
BILIRUBIN, TOTAL: 0.7
BUN/CREATININE RATIO: 12
CALCIUM: 8.8
CARBON DIOXIDE: 20
CHLORIDE: 101
CREATININE: 1.77
EGFR: 29
ENHANCED LIVER FIBROSIS (ELF) SCORE: 10.75
EOSINOPHILS: 0.4
FERRITIN, SERUM: 2821
FIB 4 INDEX: 4.3
FIB 4 INTERPRETATION: (no result)
GLOBULIN: 3.7
GLUCOSE: 98
HEMATOCRIT: 27.7
HEMOGLOBIN: 9.1
HEREDITARY HEMOCHROMATOSIS DNA MUT: (no result)
INR: 1.2
IRON BIND.CAP.(TIBC): 266
IRON SATURATION: 68
IRON: 181
LYMPHOCYTES: 24.1
MCH: 34
MCHC: 32.9
MCV: 103.4
MONOCYTES: 11
MPV: 11.7
NEUTROPHILS: 64.3
PLATELET COUNT: 158
PLATELET COUNT: 158
POTASSIUM: 3.7
PROTEIN, TOTAL: 7.7
PT: 12.4
RDW: 12.6
RED BLOOD CELL COUNT: 2.68
SODIUM: 136
UREA NITROGEN (BUN): 21
WHITE BLOOD CELL COUNT: 4.7

## 2025-04-14 NOTE — HPI-TODAY'S VISIT:
Dear Zo Reid,  April 4 labs show glucose 98 BUN 21 and these are normal but your creatinine was up to 1.77.  Please share with primary provider.   Sodium 136 potassium 3.7 chloride 101 CO2 of 20 and calcium 8.8.   Your iron saturation was elevated at 68%.  Your ferritin was elevated at 2821 and this would be suggestive of an iron overload situation.  Patients with ferritin's over a thousand are felt at increased risk to have more advanced fibrosis to be present.    Asma antibody was negative at less than 20.   Alpha-1 was MM normal   KELVIN screen was positive.    WBC was 4.7 normal but your hemoglobin was low at 9.1 hematocrit low at 27.7 RBC count low at 2.68 and MCV elevated at 103.4.  May want to check your B12 and folate levels with primary provider to see if that could be contributing to this.  Your MCH was a little bit up at 34.  Platelet count was normal however at 158.  Neutrophils were normal at 3022 and lymphocytes normal at 1136.    INR was 1.2 which was slightly up with a prothrombin time of 12.4 seconds.    Hemochromatosis mutation was run but you were negative for c282y and h63d variants. Some people have variants that are not  able to be detected by this assay.    Your fib 4 index was increased risk at 4.3 or more advanced fibrosis in part increased by your age as well as also by the increased creatinine.  Values above 2.67 are considered higher riskfor having fibrosis.  Total protein was 7.7 albumin 4 bilirubin 0.7 and direct 0.2 with alk phos 73.  These were normal.  Your AST however was up at 40 and your ALT was normal at 20 with ideal ALT less than 25.    KELVIN screen was positive at 160 nuclear speckled pattern type.  Enahnced liver fibrosis score was increased at 10.75 with values above 9.8 being considered high risk.         Guideline for ELF:   An Enhanced Liver Fibrosis (ELF) score of less than 9.8 indicates a lower risk of developing cirrhosis or liver-related events. An ELF score of 9.8 or higher indicates a higher risk.   ELF score ranges   < 9.8: Low risk of disease progression    9.8 and < 11.3: Mid risk of disease progression    11.3: High risk of disease progression  Please share with local providers. We await the radiology testing also.  Dr Stone

## 2025-04-27 ENCOUNTER — TELEPHONE ENCOUNTER (OUTPATIENT)
Dept: URBAN - METROPOLITAN AREA CLINIC 86 | Facility: CLINIC | Age: 75
End: 2025-04-27

## 2025-04-27 NOTE — HPI-TODAY'S VISIT:
Wild Reid,   April 25 ultrasound shows liver to be mildly increased in echogenicity in keeping with fatty liver.  No focal lesions seen.   No dilated bile duct radicles seen and common bile duct was 2 mm.   Prior gallbladder surgery changes seen.   Pancreas was unremarkable where seen.   Right kidney 8.1 cm and left kidney 8.3 cm with no hydronephrosis.   Spleen not well-seen.   Liver vessels were patent as expected.    In summary, liver appeared to be fatty.  Kidney parenchyma did appear to be increased in keeping with possible chronic renal disease. Please share with primary. Thank you.    Dr. Stone

## 2025-06-11 ENCOUNTER — OFFICE VISIT (OUTPATIENT)
Dept: URBAN - METROPOLITAN AREA CLINIC 86 | Facility: CLINIC | Age: 75
End: 2025-06-11
Payer: MEDICARE

## 2025-06-11 ENCOUNTER — LAB OUTSIDE AN ENCOUNTER (OUTPATIENT)
Dept: URBAN - METROPOLITAN AREA CLINIC 86 | Facility: CLINIC | Age: 75
End: 2025-06-11

## 2025-06-11 DIAGNOSIS — K63.5 COLON POLYPS: ICD-10-CM

## 2025-06-11 DIAGNOSIS — J30.9 ALLERGIC RHINITIS: ICD-10-CM

## 2025-06-11 DIAGNOSIS — D64.9 ANEMIA: ICD-10-CM

## 2025-06-11 DIAGNOSIS — E83.19 IRON OVERLOAD: ICD-10-CM

## 2025-06-11 DIAGNOSIS — E78.5 HYPERLIPIDEMIA: ICD-10-CM

## 2025-06-11 DIAGNOSIS — M85.80 OSTEOPENIA: ICD-10-CM

## 2025-06-11 DIAGNOSIS — Z79.899 HIGH RISK MEDICATION USE: ICD-10-CM

## 2025-06-11 DIAGNOSIS — R74.8 ABNORMAL LIVER ENZYMES: ICD-10-CM

## 2025-06-11 DIAGNOSIS — M19.90 DEGENERATIVE JOINT DISEASE: ICD-10-CM

## 2025-06-11 DIAGNOSIS — I10 HTN: ICD-10-CM

## 2025-06-11 DIAGNOSIS — I70.0 AORTIC ATHEROSCLEROSIS: ICD-10-CM

## 2025-06-11 DIAGNOSIS — K76.0 FATTY LIVER: ICD-10-CM

## 2025-06-11 DIAGNOSIS — K57.90 DIVERTICULOSIS: ICD-10-CM

## 2025-06-11 PROCEDURE — 99215 OFFICE O/P EST HI 40 MIN: CPT

## 2025-06-11 RX ORDER — LOSARTAN POTASSIUM 100 MG/1
TABLET, FILM COATED ORAL
Qty: 0 | Refills: 0 | Status: ACTIVE | COMMUNITY
Start: 2017-11-03

## 2025-06-11 RX ORDER — FUROSEMIDE 20 MG/1
1 TABLET TABLET ORAL ONCE A DAY
Status: ACTIVE | COMMUNITY

## 2025-06-11 RX ORDER — PANTOPRAZOLE SODIUM 40 MG/1
1 TABLET TABLET, DELAYED RELEASE ORAL ONCE A DAY
Status: ACTIVE | COMMUNITY

## 2025-06-11 RX ORDER — LORATADINE 10 MG
1 TABLET TABLET ORAL ONCE A DAY
Status: ACTIVE | COMMUNITY

## 2025-06-11 RX ORDER — AMLODIPINE BESYLATE 10 MG/1
TABLET ORAL
Qty: 0 | Refills: 0 | Status: DISCONTINUED | COMMUNITY
Start: 2017-11-08

## 2025-06-11 NOTE — HPI-TODAY'S VISIT:
Patient is a 74-year-old female followed by Dr. Hong and we are asked to see April 2025  for abnormal liver labs and fatty liver hx.  Copy the note will be sent to patient's primary providers.  April 25 ultrasound shows liver to be mildly increased in echogenicity in keeping with fatty liver.  No focal lesions seen. No dilated bile duct radicles seen and common bile duct was 2 mm. Prior gallbladder surgery changes seen. Pancreas was unremarkable where seen. Right kidney 8.1 cm and left kidney 8.3 cm with no hydronephrosis. Spleen not well-seen. Liver vessels were patent as expected.  In summary, liver appeared to be fatty.  Kidney parenchyma did appear to be increased in keeping with possible chronic renal disease. Please share with primary.  April 4 labs show glucose 98 BUN 21 and these are normal but your creatinine was up to 1.77.  Please share with primary provider. Sodium 136 potassium 3.7 chloride 101 CO2 of 20 and calcium 8.8. Your iron saturation was elevated at 68%.  Your ferritin was elevated at 2821 and this would be suggestive of an iron overload situation.  Patients with ferritin's over a thousand are felt at increased risk to have more advanced fibrosis to be present. Asma antibody was negative at less than 20. Alpha-1 was MM normal KELVIN screen was positive. WBC was 4.7 normal but your hemoglobin was low at 9.1 hematocrit low at 27.7 RBC count low at 2.68 and MCV elevated at 103.4.  May want to check your B12 and folate levels with primary provider to see if that could be contributing to this.  Your MCH was a little bit up at 34.  Platelet count was normal however at 158.  Neutrophils were normal at 3022 and lymphocytes normal at 1136. INR was 1.2 which was slightly up with a prothrombin time of 12.4 seconds.  Hemochromatosis mutation was run but you were negative for c282y and h63d variants. Some people have variants that are not  able to be detected by this assay.  Your fib 4 index was increased risk at 4.3 or more advanced fibrosis in part increased by your age as well as also by the increased creatinine.  Values above 2.67 are considered higher riskfor having fibrosis. Total protein was 7.7 albumin 4 bilirubin 0.7 and direct 0.2 with alk phos 73.  These were normal.  Your AST however was up at 40 and your ALT was normal at 20 with ideal ALT less than 25. KELVIN screen was positive at 160 nuclear speckled pattern type.  Enahnced liver fibrosis score was increased at 10.75 with values above 9.8 being considered high risk.     Guideline for ELF: An Enhanced Liver Fibrosis (ELF) score of less than 9.8 indicates a lower risk of developing cirrhosis or liver-related events. An ELF score of 9.8 or higher indicates a higher risk. ELF score ranges < 9.8: Low risk of disease progression  9.8 and < 11.3: Mid risk of disease progression  11.3: High risk of disease progression  Please share with local providers. We await the radiology testing also.   recap: Patient was last seen on July 13, 2023 at the [place] office.  Since then not seeing anyone for the liver issues.  Saw his note in 2023:  She was being seen for history of occult positive stool noted in  2023 but without weight loss for this issues.  She will be restarted on iron because she had a low iron.  She was getting a PPI.  Labs in January 2023 which showed a hemoglobin 9.4 platelet count 163.  Iron sat that was high as 81%.  Ferritin of 3822 AST of 49 and otherwise normal liver labs.  Certainly concerning why the higher iron sat was noted as well as a ferritin.  August 2022 she had been seen and had a colonoscopy done in 2022 that showed adequate prep and 3 mm descending colon polyp.  Gastric biopsies also had been done that were benign and repeat: Was recommended for 5 years.  She had an abnormal ultrasound from August 2022 suggestive of fatty liver with normal portal vein normal bile duct and absent gallbladder.  Patient coming in for abnormal liver labs and local labs from primary showed from March 262025  glucose 89 BUN 25 creatinine 1.4 elevated sodium 140 potassium 4.3 chloride 104 calcium 9.7 albumin 3.8 bilirubin 0.4 alk phos 97 AST 42 and ALT 19 and back on February 8, 2024 alk phos 104 AST 29 ALT of 17.   TSH was 2.41.  Magnesium 1.4.  June 14 2023 labs showed previously your AST had been as high as 54 down to 29 on February 2024 then these labs had been up to an AST of 42 on March 26.   WBC was 4.7 hemoglobin low at 9.4  plate count 161 and previously in December 2024 WBC 3 hemoglobin 9.7 platelet count 90 and .  The plate count had gone up.   proBNP was 1223 when the March 26 labs were run and previously had been lower at 745 back in 2023.  She says has a heart montior and told heart is ok and no heart failure and sees someone for heart.    Platelet count had been as low as 90,000 back in  December 2024 and up to 161 as of March 26 that was one of the highest platelet counts in the last 2 years.  She had an AST back in June 2022 there was an AST of 54 and normal ALT and a normal bilirubin with the ALT being 32 with ideal ALT being less than 25.  Hep C antibody negative B surface antigen negative.  Medicines had included pantoprazole, loratadine, iron, allopurinol and losartan.  Weight was noted to be in class I obesity with a BMI of 32.88 and a weight of 168.4 in 2023 and weight now was 147 and down 21 pounds since when saw him in 2023.  Had pneumonia recently.  April 2023 imaging showed liver with normal echogenicity and no lesions portal vein patent and hepatic veins patent.  No dilated ducts.  Common bile duct 3 mm.  Postcholecystectomy changes seen.  Suresh sign negative.  Right kidney 8.9 cm and left kidney 9.7 cm.  Spleen 7.2 cm.  Ultrasound felt to be normal again at that timeframe.  We also saw the referral records from [facility] for abnormal liver labs and concern for hemochromatosis.  Medicines listed included acetaminophen as needed, clotrimazole 1% cream to the vaginal area as needed, iron 325 mg once a day previously, fluticasone 50 mcg spray, Lasix 20 mg a day.  Loratadine 10 mg a day.  Losartan/hydrochlorothiazide 50/12.5 once a day.  Meclizine 25 mg 3 times a day.  MiraLAX 17 g once a day as needed.  Nitroglycerin as needed.  Pantoprazole 40 mg a day.  Prolia every 6 months.  Triamcinolone acetonide 0.1% cream apply as needed.  Vitamin D 50,000 units once a week.  December 13, 2024 labs showed hep A immunity.  Hep A IgM negative.  B surface antigen negative B surface antibody negative.  B core total negative for patient needs hep B vaccine series.  Hep C antibody negative.  WBC 3.0 hemoglobin 9.7 .  Plate count low at 90.  Neutrophils 1.6 lymphocytes 1.0.  Cholesterol 164 triglycerides 286 HDL 39 LDL 78.  Iron sat 72%.  August 2024 labs show WBC 5.3 hemoglobin 9.3  plate count 141.  Ferritin 3742.  Folate 3.2.  TSH duplicate.  HIV negative.  TSH 2.15 B12 675 ceruloplasmin 26.4 and ferritin 3386.  Oct 2023 Cholesterol 143, triglycerides 146 HDL 46 LDL 72.   Plan  1. Need to update labs and meld. 2. Pt will do fibroscan. 3. Asked pt if she is seeing blood specialist and she was not sure and need to look at htat as iron up but anemic so cannot do any phlebotomy and wonder why anemic  this. Refer to DR De Souza near her. 4. She wants to not do the mri for now and so we try to fibroscan and look at that u,s in oct and if needed can try to do the mri if that is not givign the answer due to renal cocnerns. 6. Needs to consider hep b vaccine as no hep B immunity status.  Patient confirmed hep A immune. 7. See [facility] or [facility] in 6 weeks to review the fibroscan and look at that issue.  Duration of visit was 40 minutes total with 15 minutes spent preparing chart, and loading information into [facility] with additional 25 minutes spent for this face to face visit with time spent reviewing patient's chart, notes, labs and discussing treatment plan with time spent discussing plans with pt.

## 2025-06-12 ENCOUNTER — TELEPHONE ENCOUNTER (OUTPATIENT)
Dept: URBAN - METROPOLITAN AREA CLINIC 86 | Facility: CLINIC | Age: 75
End: 2025-06-12

## 2025-06-12 LAB
A/G RATIO: 1.1
ABSOLUTE BASOPHILS: 11
ABSOLUTE EOSINOPHILS: 38
ABSOLUTE LYMPHOCYTES: 1364
ABSOLUTE MONOCYTES: 277
ABSOLUTE NEUTROPHILS: 2109
ALBUMIN: 3.9
ALKALINE PHOSPHATASE: 91
ALT (SGPT): 16
AST (SGOT): 32
BASOPHILS: 0.3
BILIRUBIN, TOTAL: 0.4
BUN/CREATININE RATIO: 21
BUN: 32
CALCIUM: 9.4
CARBON DIOXIDE, TOTAL: 22
CHLORIDE: 105
CREATININE: 1.51
EGFR: 36
EOSINOPHILS: 1
FERRITIN, SERUM: 2176
GLOBULIN, TOTAL: 3.7
GLUCOSE: 94
HEMATOCRIT: 28.2
HEMOGLOBIN: 9.3
INR: 1.1
IRON BIND.CAP.(TIBC): 240
IRON SATURATION: 63
IRON: 150
LYMPHOCYTES: 35.9
MCH: 33.8
MCHC: 33
MCV: 102.5
MONOCYTES: 7.3
MPV: 12.3
NEUTROPHILS: 55.5
PLATELET COUNT: 94
POTASSIUM: 4.7
PROTEIN, TOTAL: 7.6
PT: 11.2
RDW: 11.8
RED BLOOD CELL COUNT: 2.75
SODIUM: 139
WHITE BLOOD CELL COUNT: 3.8

## 2025-06-12 NOTE — HPI-TODAY'S VISIT:
Dear Zo Reid,   June 11 labs show us your iron remains elevated at 63% but down from 68% in April.   Ferritin remains elevated at 2176 down from 2821.   Glucose normal at 94 BUN elevated at 32 and creatinine elevated at 1.51.   Sodium 139 potassium 4.7 calcium 9.4 albumin 3.9 bilirubin 0.4 alk phos 91 AST 32 ALT of 16 with ideal ALT less than 25.   WBC 3.8 which is normal and hemoglobin low at 9.3 but up from 9.1 in April.  MCV remains about 102.5 but down from 102.4 in April.   Platelet count was low at 94 and was previously normal back on April 4.   RBC count remains low but up to 2.75 from 2.68.   Neutrophils 2109 and lymphocytes 1364.   INR normal at 1.1 and down from 1.2 before.  Prothrombin time 11.2 seconds down from 12.4 seconds.    Important for you to see Dr. De Souza as we discussed locally hematologist to see options from his side to try to help with the iron overload.  Not able to be done in the normal way via phlebotomy due to the anemia.   Good to see though that labs are trending a little lower over time.   Await your FibroScan as we discussed.   Dr. Stone

## 2025-06-24 ENCOUNTER — OFFICE VISIT (OUTPATIENT)
Dept: URBAN - METROPOLITAN AREA CLINIC 85 | Facility: CLINIC | Age: 75
End: 2025-06-24
Payer: MEDICARE

## 2025-06-24 DIAGNOSIS — K76.0 FATTY LIVER: ICD-10-CM

## 2025-06-24 DIAGNOSIS — E83.19 IRON OVERLOAD: ICD-10-CM

## 2025-06-24 DIAGNOSIS — E78.5 HYPERLIPIDEMIA: ICD-10-CM

## 2025-06-24 DIAGNOSIS — R74.8 ABNORMAL LIVER ENZYMES: ICD-10-CM

## 2025-06-24 PROCEDURE — 76981 USE PARENCHYMA: CPT | Performed by: INTERNAL MEDICINE

## 2025-06-24 RX ORDER — LORATADINE 10 MG
1 TABLET TABLET ORAL ONCE A DAY
Status: ACTIVE | COMMUNITY

## 2025-06-24 RX ORDER — FUROSEMIDE 20 MG/1
1 TABLET TABLET ORAL ONCE A DAY
Status: ACTIVE | COMMUNITY

## 2025-06-24 RX ORDER — LOSARTAN POTASSIUM 100 MG/1
TABLET, FILM COATED ORAL
Qty: 0 | Refills: 0 | Status: ACTIVE | COMMUNITY
Start: 2017-11-03

## 2025-06-24 RX ORDER — PANTOPRAZOLE SODIUM 40 MG/1
1 TABLET TABLET, DELAYED RELEASE ORAL ONCE A DAY
Status: ACTIVE | COMMUNITY

## 2025-06-25 ENCOUNTER — CLAIMS CREATED FROM THE CLAIM WINDOW (OUTPATIENT)
Dept: URBAN - METROPOLITAN AREA CLINIC 117 | Facility: CLINIC | Age: 75
End: 2025-06-25
Payer: MEDICARE

## 2025-06-25 ENCOUNTER — TELEPHONE ENCOUNTER (OUTPATIENT)
Dept: URBAN - METROPOLITAN AREA CLINIC 86 | Facility: CLINIC | Age: 75
End: 2025-06-25

## 2025-06-25 DIAGNOSIS — K76.0 FATTY LIVER: ICD-10-CM

## 2025-06-25 DIAGNOSIS — E78.5 HYPERLIPIDEMIA: ICD-10-CM

## 2025-06-25 DIAGNOSIS — R74.8 ABNORMAL LIVER ENZYMES: ICD-10-CM

## 2025-06-25 DIAGNOSIS — E83.19 IRON OVERLOAD: ICD-10-CM

## 2025-06-25 PROCEDURE — 76981 USE PARENCHYMA: CPT | Performed by: INTERNAL MEDICINE

## 2025-06-25 NOTE — HPI-TODAY'S VISIT:
Dear Zo Reid,   June 24 FibroScan shows us a CAP score of 199 dB/m which is in the normal fat range for the liver.   Your median liver fibrosis score was 4.5 kPa which is in the F0/no fibrosis level for fatty liver scale which goes from 1.5 up to 6.5.   Your individual range of values shows us that the lowest reading that they saw was at 3.7 and that the highest reading that they saw was a 5.7 kPa reading all of which should be in the lowest level fibrosis range.    Very pleased to see this and you need to continue the healthy habits and efforts that you are doing to keep the fatty liver as optimized as possible.    As you recall, the April 25th ultrasound said that the liver was mildly fatty so that appears to be doing better now than even at that timeframe.  Your fib 4 index had suggested that you had an increased risk to have fibrosis but we thought that this may have been an overestimate.  The enhanced liver fibrosis score also suggested a higher risk for you.   We will continue to monitor you over time and we now have this baseline to follow you with. Thank you.   Dr. Stone

## 2025-08-14 ENCOUNTER — OFFICE VISIT (OUTPATIENT)
Dept: URBAN - METROPOLITAN AREA CLINIC 86 | Facility: CLINIC | Age: 75
End: 2025-08-14
Payer: MEDICARE

## 2025-08-14 DIAGNOSIS — I70.0 AORTIC ATHEROSCLEROSIS: ICD-10-CM

## 2025-08-14 DIAGNOSIS — I10 HTN: ICD-10-CM

## 2025-08-14 DIAGNOSIS — R74.8 ABNORMAL LIVER ENZYMES: ICD-10-CM

## 2025-08-14 DIAGNOSIS — Z79.899 HIGH RISK MEDICATION USE: ICD-10-CM

## 2025-08-14 DIAGNOSIS — D64.9 ANEMIA: ICD-10-CM

## 2025-08-14 DIAGNOSIS — M19.90 DEGENERATIVE JOINT DISEASE: ICD-10-CM

## 2025-08-14 DIAGNOSIS — J30.9 ALLERGIC RHINITIS: ICD-10-CM

## 2025-08-14 DIAGNOSIS — E83.19 IRON OVERLOAD: ICD-10-CM

## 2025-08-14 DIAGNOSIS — D69.1 THROMBOCYTOPENIA: ICD-10-CM

## 2025-08-14 DIAGNOSIS — K57.90 DIVERTICULOSIS: ICD-10-CM

## 2025-08-14 DIAGNOSIS — E78.5 HYPERLIPIDEMIA: ICD-10-CM

## 2025-08-14 DIAGNOSIS — K76.0 FATTY LIVER: ICD-10-CM

## 2025-08-14 DIAGNOSIS — M85.80 OSTEOPENIA: ICD-10-CM

## 2025-08-14 DIAGNOSIS — K63.5 COLON POLYPS: ICD-10-CM

## 2025-08-14 PROCEDURE — 99215 OFFICE O/P EST HI 40 MIN: CPT

## 2025-08-14 RX ORDER — PANTOPRAZOLE SODIUM 40 MG/1
1 TABLET TABLET, DELAYED RELEASE ORAL ONCE A DAY
Status: ACTIVE | COMMUNITY

## 2025-08-14 RX ORDER — LOSARTAN POTASSIUM 100 MG/1
TABLET, FILM COATED ORAL
Qty: 0 | Refills: 0 | Status: ACTIVE | COMMUNITY
Start: 2017-11-03

## 2025-08-14 RX ORDER — FUROSEMIDE 20 MG/1
1 TABLET TABLET ORAL ONCE A DAY
Status: ACTIVE | COMMUNITY

## 2025-08-14 RX ORDER — LORATADINE 10 MG
1 TABLET TABLET ORAL ONCE A DAY
Status: ACTIVE | COMMUNITY